# Patient Record
Sex: FEMALE | Race: WHITE | NOT HISPANIC OR LATINO | Employment: PART TIME | URBAN - METROPOLITAN AREA
[De-identification: names, ages, dates, MRNs, and addresses within clinical notes are randomized per-mention and may not be internally consistent; named-entity substitution may affect disease eponyms.]

---

## 2020-02-05 ENCOUNTER — OFFICE VISIT (OUTPATIENT)
Dept: FAMILY MEDICINE CLINIC | Facility: CLINIC | Age: 25
End: 2020-02-05

## 2020-02-05 VITALS
HEIGHT: 64 IN | WEIGHT: 135.6 LBS | TEMPERATURE: 98.6 F | HEART RATE: 80 BPM | BODY MASS INDEX: 23.15 KG/M2 | DIASTOLIC BLOOD PRESSURE: 80 MMHG | RESPIRATION RATE: 16 BRPM | SYSTOLIC BLOOD PRESSURE: 114 MMHG

## 2020-02-05 DIAGNOSIS — F41.9 ANXIETY AND DEPRESSION: ICD-10-CM

## 2020-02-05 DIAGNOSIS — Z00.00 ANNUAL PHYSICAL EXAM: Primary | ICD-10-CM

## 2020-02-05 DIAGNOSIS — M85.80 OSTEOPENIA, UNSPECIFIED LOCATION: ICD-10-CM

## 2020-02-05 DIAGNOSIS — F32.A ANXIETY AND DEPRESSION: ICD-10-CM

## 2020-02-05 DIAGNOSIS — M62.838 MUSCLE SPASM: ICD-10-CM

## 2020-02-05 PROCEDURE — 99395 PREV VISIT EST AGE 18-39: CPT | Performed by: FAMILY MEDICINE

## 2020-02-05 RX ORDER — GABAPENTIN 100 MG/1
CAPSULE ORAL
COMMUNITY
Start: 2020-01-04 | End: 2020-07-29

## 2020-02-05 RX ORDER — VENLAFAXINE HYDROCHLORIDE 75 MG/1
75 CAPSULE, EXTENDED RELEASE ORAL
COMMUNITY
Start: 2019-11-02

## 2020-02-05 RX ORDER — LAMOTRIGINE 25 MG/1
75 TABLET ORAL DAILY
COMMUNITY
Start: 2019-11-02

## 2020-02-05 RX ORDER — ARIPIPRAZOLE 10 MG/1
TABLET ORAL
COMMUNITY
Start: 2020-01-04

## 2020-02-05 NOTE — PROGRESS NOTES
[address]    NAME: Javy Morales  AGE: 30 y o  SEX: female  : 1995     DATE: 2020     Assessment and Plan:     Problem List Items Addressed This Visit        Other    Annual physical exam - Primary     -hasn't been seen by physician in over 3 years  -history osteopenia take vitamin-D or calcium supplements  -otherwise medically has no significant past history  -has never had a Pap smear, counseled patient importance exam and advised her to schedule appointment here or with an OB based on her preference           Anxiety and depression     -PHQ-9 17 today  -patient reports history significant for anxiety and depression and an eating disorder  She states her eating disorder is anorexia with purging tendencies for which she underwent an eating disorder rehab less than 1 year ago  -she reports compliance with her medications and states that she follows up with a psychiatrist once a month  -she reports chest tightness and shortness of breath often related to high stress situations and anxiety  -also notes sleep disturbances due to nightmares that wake her and difficulty falling back asleep  -also reports left-sided abdominal pain associated with nausea and occasional vomiting that she also associates with increasing stress  -advised patient to continue regular follow-up with psychiatrist and maintain compliance with medication  Also discussed that she can make an appointment here if she needs to talk         Relevant Medications    ARIPiprazole (ABILIFY) 10 mg tablet    venlafaxine (EFFEXOR-XR) 75 mg 24 hr capsule      Other Visit Diagnoses     Osteopenia, unspecified location        Relevant Orders    Vitamin D 25 hydroxy    Muscle spasm        Relevant Orders    Comprehensive metabolic panel          Immunizations and preventive care screenings were discussed with patient today   Appropriate education was printed on patient's after visit summary  Counseling:  Sexual health: discussed sexually transmitted diseases, partner selection, use of condoms, avoidance of unintended pregnancy, and contraceptive alternatives  Exercise: the importance of regular exercise/physical activity was discussed  Recommend exercise 3-5 times per week for at least 30 minutes  · Depression and anxiety: discussed importance of medication compliance and regular follow-up with psychiatrist      Depression Screening and Follow-up Plan: Patient's depression screening was positive with a PHQ-2 score of 4  Their PHQ-9 score was 17  Continue regular follow-up with their mental health provider who is managing their mental health condition(s)  Chief Complaint:     Chief Complaint   Patient presents with    New Patient      History of Present Illness:     Adult Annual Physical   Patient here for a comprehensive physical exam  The patient reports problems - Depression, anxiety, occasional left-sided pain associated with nausea and occasional and spasm in right  Diet and Physical Activity  · Diet/Nutrition: well balanced diet  · Exercise: no formal exercise  Depression Screening  PHQ-9 Depression Screening    PHQ-9:    Frequency of the following problems over the past two weeks:       Little interest or pleasure in doing things:  1 - several days  Feeling down, depressed, or hopeless:  3 - nearly every day  Trouble falling or staying asleep, or sleeping too much:  3 - nearly every day  Feeling tired or having little energy:  2 - more than half the days  Poor appetite or overeating:  3 - nearly every day  Feeling bad about yourself - or that you are a failure or have let yourself or your family down:  2 - more than half the days  Trouble concentrating on things, such as reading the newspaper or watching television:  0 - not at all  Moving or speaking so slowly that other people could have noticed   Or the opposite - being so fidgety or restless that you have been moving around a lot more than usual:  1 - several days  Thoughts that you would be better off dead, or of hurting yourself in some way:  2 - more than half the days  PHQ-2 Score:  4  PHQ-9 Score:  17       General Health  · Sleep: Patient has no issue with sleep initiation, but states she frequently nightmares difficulty back asleep  ·       /GYN Health  · Last menstrual period:  2 weeks ago  · Contraceptive method: None  · History of STDs?: no      Review of Systems:     Review of Systems   Constitutional: Negative for activity change, appetite change, fatigue and fever  HENT: Negative for congestion, postnasal drip, sneezing, sore throat and trouble swallowing  Eyes: Negative for photophobia, pain and visual disturbance  Respiratory: Positive for chest tightness (Anxiety related)  Negative for cough, choking and shortness of breath  Cardiovascular: Positive for chest pain (Anxiety related)  Gastrointestinal: Positive for abdominal pain (Left-sided, worse with stress), nausea and vomiting  Negative for constipation and diarrhea  Genitourinary: Negative for difficulty urinating, dysuria, flank pain, menstrual problem, urgency, vaginal bleeding, vaginal discharge and vaginal pain  Musculoskeletal: Negative for back pain  Skin: Negative for color change and pallor  Neurological: Positive for headaches ( occasional migraines)  Negative for numbness  Psychiatric/Behavioral: Positive for sleep disturbance  The patient is nervous/anxious  Past Medical History:     Past Medical History:   Diagnosis Date    Anxiety     Depression     Eating disorder       Past Surgical History:     History reviewed  No pertinent surgical history     Social History:     Social History     Socioeconomic History    Marital status: Single     Spouse name: None    Number of children: None    Years of education: None    Highest education level: None   Occupational History    None Social Needs    Financial resource strain: None    Food insecurity:     Worry: None     Inability: None    Transportation needs:     Medical: None     Non-medical: None   Tobacco Use    Smoking status: Never Smoker    Smokeless tobacco: Never Used   Substance and Sexual Activity    Alcohol use: Not Currently    Drug use: Yes     Types: Marijuana    Sexual activity: Yes     Partners: Female     Birth control/protection: None   Lifestyle    Physical activity:     Days per week: None     Minutes per session: None    Stress: None   Relationships    Social connections:     Talks on phone: None     Gets together: None     Attends [employer] service: None     Active member of club or organization: None     Attends meetings of clubs or organizations: None     Relationship status: None    Intimate partner violence:     Fear of current or ex partner: None     Emotionally abused: None     Physically abused: None     Forced sexual activity: None   Other Topics Concern    None   Social History Narrative    None      Family History:     Family History   Problem Relation Age of Onset    Leukemia Mother     No Known Problems Father     Anxiety disorder Sister     Depression Brother       Current Medications:     Current Outpatient Medications   Medication Sig Dispense Refill    ARIPiprazole (ABILIFY) 10 mg tablet       gabapentin (NEURONTIN) 100 mg capsule       lamoTRIgine (LaMICtal) 25 mg tablet Take 75 mg by mouth daily      venlafaxine (EFFEXOR-XR) 75 mg 24 hr capsule Take 75 mg by mouth       No current facility-administered medications for this visit  Allergies:      Allergies   Allergen Reactions    Sulfa Antibiotics       Physical Exam:     /80 (BP Location: Left arm, Patient Position: Sitting, Cuff Size: Standard)   Pulse 80   Temp 98 6 °F (37 °C) (Tympanic)   Resp 16   Ht 5' 4" (1 626 m)   Wt 61 5 kg (135 lb 9 6 oz)   BMI 23 28 kg/m²     Physical Exam   Constitutional: She is oriented to person, place, and time  She appears well-developed  No distress  HENT:   Head: Normocephalic and atraumatic  Right Ear: External ear normal    Left Ear: External ear normal    Nose: Nose normal    Oropharynx clear, dry   Eyes: Pupils are equal, round, and reactive to light  Conjunctivae and EOM are normal    Neck: Normal range of motion  Neck supple  No thyromegaly present  Cardiovascular: Normal rate, regular rhythm, normal heart sounds and intact distal pulses  Pulmonary/Chest: Effort normal and breath sounds normal  No respiratory distress  She exhibits no tenderness  Abdominal: Soft  Bowel sounds are normal  She exhibits no distension  There is no tenderness  Musculoskeletal: Normal range of motion  She exhibits no edema  Lymphadenopathy:     She has no cervical adenopathy  Neurological: She is alert and oriented to person, place, and time  Skin: Skin is warm  Capillary refill takes less than 2 seconds  She is not diaphoretic         Parvez Martinez MD   [facility]

## 2020-02-05 NOTE — ASSESSMENT & PLAN NOTE
-PHQ-9 17 today  -patient reports history significant for anxiety and depression and an eating disorder  She states her eating disorder is anorexia with purging tendencies for which she underwent an eating disorder rehab less than 1 year ago  -she reports compliance with her medications and states that she follows up with a psychiatrist once a month  -she reports chest tightness and shortness of breath often related to high stress situations and anxiety  -also notes sleep disturbances due to nightmares that wake her and difficulty falling back asleep  -also reports left-sided abdominal pain associated with nausea and occasional vomiting that she also associates with increasing stress  -advised patient to continue regular follow-up with psychiatrist and maintain compliance with medication    Also discussed that she can make an appointment here if she needs to talk

## 2020-02-05 NOTE — ASSESSMENT & PLAN NOTE
-hasn't been seen by physician in over 3 years  -history osteopenia take vitamin-D or calcium supplements  -otherwise medically has no significant past history  -has never had a Pap smear, counseled patient importance exam and advised her to schedule appointment here or with an OB based on her preference

## 2020-02-14 LAB
25(OH)D3+25(OH)D2 SERPL-MCNC: 37.6 NG/ML (ref 30–100)
ALBUMIN SERPL-MCNC: 5 G/DL (ref 3.9–5)
ALBUMIN/GLOB SERPL: 2.1 {RATIO} (ref 1.2–2.2)
ALP SERPL-CCNC: 45 IU/L (ref 39–117)
ALT SERPL-CCNC: 15 IU/L (ref 0–32)
AST SERPL-CCNC: 17 IU/L (ref 0–40)
BILIRUB SERPL-MCNC: 0.4 MG/DL (ref 0–1.2)
BUN SERPL-MCNC: 9 MG/DL (ref 6–20)
BUN/CREAT SERPL: 11 (ref 9–23)
CALCIUM SERPL-MCNC: 9.8 MG/DL (ref 8.7–10.2)
CHLORIDE SERPL-SCNC: 102 MMOL/L (ref 96–106)
CO2 SERPL-SCNC: 25 MMOL/L (ref 20–29)
CREAT SERPL-MCNC: 0.79 MG/DL (ref 0.57–1)
GLOBULIN SER-MCNC: 2.4 G/DL (ref 1.5–4.5)
GLUCOSE SERPL-MCNC: 99 MG/DL (ref 65–99)
POTASSIUM SERPL-SCNC: 4.2 MMOL/L (ref 3.5–5.2)
PROT SERPL-MCNC: 7.4 G/DL (ref 6–8.5)
SL AMB EGFR AFRICAN AMERICAN: 119 ML/MIN/1.73
SL AMB EGFR NON AFRICAN AMERICAN: 104 ML/MIN/1.73
SODIUM SERPL-SCNC: 140 MMOL/L (ref 134–144)

## 2020-07-29 ENCOUNTER — TELEPHONE (OUTPATIENT)
Dept: FAMILY MEDICINE CLINIC | Facility: CLINIC | Age: 25
End: 2020-07-29

## 2020-07-29 ENCOUNTER — OFFICE VISIT (OUTPATIENT)
Dept: FAMILY MEDICINE CLINIC | Facility: CLINIC | Age: 25
End: 2020-07-29

## 2020-07-29 VITALS
RESPIRATION RATE: 16 BRPM | BODY MASS INDEX: 24.55 KG/M2 | WEIGHT: 143.8 LBS | HEART RATE: 76 BPM | SYSTOLIC BLOOD PRESSURE: 122 MMHG | DIASTOLIC BLOOD PRESSURE: 80 MMHG | HEIGHT: 64 IN | TEMPERATURE: 99.9 F

## 2020-07-29 DIAGNOSIS — B35.0 TINEA CAPITIS: Primary | ICD-10-CM

## 2020-07-29 PROCEDURE — 3008F BODY MASS INDEX DOCD: CPT | Performed by: FAMILY MEDICINE

## 2020-07-29 PROCEDURE — 1036F TOBACCO NON-USER: CPT | Performed by: FAMILY MEDICINE

## 2020-07-29 PROCEDURE — 99213 OFFICE O/P EST LOW 20 MIN: CPT | Performed by: FAMILY MEDICINE

## 2020-07-29 RX ORDER — ITRACONAZOLE 100 MG/1
100 CAPSULE ORAL DAILY
Qty: 7 CAPSULE | Refills: 0 | Status: SHIPPED | OUTPATIENT
Start: 2020-07-29 | End: 2020-08-05

## 2020-07-29 RX ORDER — CLOTRIMAZOLE 1 %
CREAM (GRAM) TOPICAL 2 TIMES DAILY
Qty: 30 G | Refills: 0 | Status: SHIPPED | OUTPATIENT
Start: 2020-07-29 | End: 2020-08-12

## 2020-07-29 NOTE — TELEPHONE ENCOUNTER
Dwight Garcai from [id number] [facility] called to inform doctor that the patient is also taking Trazodone and it is a severe interaction with the medication prescribed today [itraconazole (SPORANOX)] by doctor  Just want to be sure it is okay to continue with script for pt

## 2020-07-29 NOTE — PATIENT INSTRUCTIONS
Tinea Capitis   WHAT YOU NEED TO KNOW:   Tinea capitis is a scalp infection caused by a fungus  Tinea capitis is also called ringworm of the scalp or head  It is most common among children  DISCHARGE INSTRUCTIONS:   Contact your healthcare provider if:   · You have a fever  · Your infection continues to spread after 7 days of treatment  · Other areas of your scalp become red, warm, tender, and swollen  · You have questions or concerns about your condition or care  Medicines:   · Antifungal medicine  is given as a pill  Take the medicine until it is gone, even if your scalp looks better sooner  Your healthcare provider may also recommend an antifungal cream      · Take your medicine as directed  Contact your healthcare provider if you think your medicine is not helping or if you have side effects  Tell him or her if you are allergic to any medicine  Keep a list of the medicines, vitamins, and herbs you take  Include the amounts, and when and why you take them  Bring the list or the pill bottles to follow-up visits  Carry your medicine list with you in case of an emergency  Follow up with your healthcare provider as directed:  Write down your questions so you remember to ask them during your visits  Prevent the spread of tinea capitis:   · Use antifungal shampoo as directed  Use a clean towel each time you wash your hair  Do not scratch your scalp  This may cause the infection to spread to other areas of your scalp  If your child has an infection, he can go to school once he is using medicine and shampoo regularly  · Do not share personal items  Do not share towels, brushes, armstrong, or hair accessories  · Wash items in hot water  Wash all towels, clothes, and bedding in hot water  Use laundry soap  Wash brushes and armstrong, barrettes, and hats in hot, soapy water  · Keep your skin, hair, and nails clean and dry  Bathe every day  Wash your hands often      · Have infected pets treated by a   A patch of missing fur is a sign of infection in a pet  Wear gloves and long sleeves if you handle an infected animal  Always wash your hands after handling the animal  Vacuum your home to remove infected fur or skin flakes  Disinfect surfaces and bedding that your animal comes into contact with  © 2017 [employer] Information is for End User's use only and may not be sold, redistributed or otherwise used for commercial purposes  All illustrations and images included in CareNotes® are the copyrighted property of A D A M [employer]  or Filippo Gant  The above information is an  only  It is not intended as medical advice for individual conditions or treatments  Talk to your doctor, nurse or pharmacist before following any medical regimen to see if it is safe and effective for you

## 2020-07-29 NOTE — PROGRESS NOTES
Assessment/Plan:    No problem-specific Assessment & Plan notes found for this encounter  1  Tinea capitis  Discussed most likely diagnosis with the patient today  We discussed treatment options and came to the shared decision to proceed with itraconazole x 1 week  She will try this x 1 week  If not significantly improved by the end of the week will call us  May decide to provide a second script x 1 more week  If still not improved may consider biopsy  She was given strict return/ER precautions and a handout regarding this condition  She will otherwise follow-up in 6 months for a yearly physical  She agreed with this plan  - itraconazole (SPORANOX) 100 mg capsule; Take 1 capsule (100 mg total) by mouth daily for 7 days  Dispense: 7 capsule; Refill: 0  Side effects were reviewed with the patient  A review of potential interactions was also reviewed  Subjective:      Patient ID: Carol Mcknight is a 30 y o  female  Effexor  HPI    This is a pleasant 29 yo female who presents for an acute visit  She has a history of anxiety/depression and takes Abilify, Neurotin, Lamictal  She presents today with a rash on her neck x 1 week  It is pruritic  The rash is on her left neck  Denies any new exposures  The rash is pruritic  It is not draining  She denies any fevers, chills, night sweats  Since her last visit her she had a normal pap with a [address] [place] [address] provider on 2/5/2020  The following portions of the patient's history were reviewed and updated as appropriate: allergies, current medications, past family history, past medical history, past social history, past surgical history and problem list     Review of Systems   Constitutional: Negative for chills, fever and unexpected weight change  HENT: Negative for hearing loss, nosebleeds and sore throat  Eyes: Negative for pain, redness and visual disturbance  Respiratory: Negative for cough, shortness of breath and wheezing  Cardiovascular: Negative for chest pain, palpitations and leg swelling  Gastrointestinal: Negative for abdominal pain, nausea and vomiting  Endocrine: Negative for polydipsia and polyuria  Genitourinary: Negative for dysuria and hematuria  Musculoskeletal: Negative for joint swelling and myalgias  Skin: Positive for rash  Negative for wound  Neurological: Negative for dizziness, numbness and headaches  Psychiatric/Behavioral: Negative for decreased concentration, dysphoric mood and suicidal ideas  The patient is not nervous/anxious  Objective:      /80 (BP Location: Left arm, Patient Position: Sitting, Cuff Size: Large)   Pulse 76   Temp 99 9 °F (37 7 °C) (Tympanic)   Resp 16   Ht 5' 4" (1 626 m)   Wt 65 2 kg (143 lb 12 8 oz)   BMI 24 68 kg/m²          Physical Exam   Constitutional: She appears well-developed and well-nourished  No distress  HENT:   Head: Normocephalic and atraumatic  Eyes: Pupils are equal, round, and reactive to light  Right eye exhibits no discharge  Left eye exhibits no discharge  Neck: Normal range of motion  Neck supple  Cardiovascular: Normal rate and regular rhythm  Exam reveals no friction rub  No murmur heard  Pulmonary/Chest: Effort normal and breath sounds normal  She has no wheezes  Abdominal: Soft  She exhibits no distension  There is no tenderness  Skin: Skin is warm and dry  She is not diaphoretic  Annular left posterior neck, just inferior to hairline  There is raised scaly edges, with central erythema  There is no central clearing  It measures approximately 1 x 1 5 cm's  There is no induration, drainage, fluctuance